# Patient Record
Sex: MALE | Race: WHITE | NOT HISPANIC OR LATINO
[De-identification: names, ages, dates, MRNs, and addresses within clinical notes are randomized per-mention and may not be internally consistent; named-entity substitution may affect disease eponyms.]

---

## 2022-04-11 ENCOUNTER — APPOINTMENT (OUTPATIENT)
Dept: FAMILY MEDICINE | Facility: CLINIC | Age: 27
End: 2022-04-11
Payer: OTHER GOVERNMENT

## 2022-04-11 VITALS
TEMPERATURE: 97 F | HEART RATE: 63 BPM | SYSTOLIC BLOOD PRESSURE: 98 MMHG | HEIGHT: 74 IN | DIASTOLIC BLOOD PRESSURE: 63 MMHG | OXYGEN SATURATION: 99 % | BODY MASS INDEX: 20.53 KG/M2 | WEIGHT: 160 LBS

## 2022-04-11 DIAGNOSIS — M54.9 DORSALGIA, UNSPECIFIED: ICD-10-CM

## 2022-04-11 DIAGNOSIS — Z80.8 FAMILY HISTORY OF MALIGNANT NEOPLASM OF OTHER ORGANS OR SYSTEMS: ICD-10-CM

## 2022-04-11 DIAGNOSIS — Z82.49 FAMILY HISTORY OF ISCHEMIC HEART DISEASE AND OTHER DISEASES OF THE CIRCULATORY SYSTEM: ICD-10-CM

## 2022-04-11 DIAGNOSIS — D22.9 MELANOCYTIC NEVI, UNSPECIFIED: ICD-10-CM

## 2022-04-11 DIAGNOSIS — Z83.79 FAMILY HISTORY OF OTHER DISEASES OF THE DIGESTIVE SYSTEM: ICD-10-CM

## 2022-04-11 DIAGNOSIS — Z83.49 FAMILY HISTORY OF OTHER ENDOCRINE, NUTRITIONAL AND METABOLIC DISEASES: ICD-10-CM

## 2022-04-11 DIAGNOSIS — G89.29 DORSALGIA, UNSPECIFIED: ICD-10-CM

## 2022-04-11 PROCEDURE — 99385 PREV VISIT NEW AGE 18-39: CPT

## 2022-04-11 RX ORDER — FLUTICASONE PROPIONATE 50 UG/1
50 SPRAY, METERED NASAL
Refills: 0 | Status: ACTIVE | COMMUNITY

## 2022-04-11 RX ORDER — MULTIVITAMIN
TABLET ORAL
Refills: 0 | Status: ACTIVE | COMMUNITY

## 2022-04-11 NOTE — PHYSICAL EXAM
[No Acute Distress] : no acute distress [Well Nourished] : well nourished [Well Developed] : well developed [Well-Appearing] : well-appearing [Normal Sclera/Conjunctiva] : normal sclera/conjunctiva [PERRL] : pupils equal round and reactive to light [EOMI] : extraocular movements intact [Normal Outer Ear/Nose] : the outer ears and nose were normal in appearance [Normal Oropharynx] : the oropharynx was normal [No Lymphadenopathy] : no lymphadenopathy [Supple] : supple [No Respiratory Distress] : no respiratory distress  [No Accessory Muscle Use] : no accessory muscle use [Clear to Auscultation] : lungs were clear to auscultation bilaterally [Normal Rate] : normal rate  [Regular Rhythm] : with a regular rhythm [Normal S1, S2] : normal S1 and S2 [No Edema] : there was no peripheral edema [No Palpable Aorta] : no palpable aorta [Soft] : abdomen soft [Non Tender] : non-tender [Non-distended] : non-distended [Normal Bowel Sounds] : normal bowel sounds [Normal Posterior Cervical Nodes] : no posterior cervical lymphadenopathy [Normal Anterior Cervical Nodes] : no anterior cervical lymphadenopathy [No Joint Swelling] : no joint swelling [Grossly Normal Strength/Tone] : grossly normal strength/tone [No Rash] : no rash [Coordination Grossly Intact] : coordination grossly intact [Normal Gait] : normal gait [Normal Affect] : the affect was normal [Normal Insight/Judgement] : insight and judgment were intact [de-identified] : +right shoulder lesion appears pink and dry; scatter moles on back

## 2022-04-11 NOTE — PLAN
[FreeTextEntry1] : 1. Health Maintenance:\par - routine labs\par - routine STI testing\par - follow up PRN\par \par 2. Skin Mole\par - Pt with lesion on right shoulder -- painful at times and increasing in size\par - Pt instructed to follow up with dermatology; referral  provided\par \par 3. Chronic Back Pain\par - Pt feels it is much improved with yoga; minimal and intermittent\par - per pt request referral for chiropractor provided\par \par 4. Nocturia/ Varicocele  \par - Pt reports nocturia despite change in bedtime sleep hygiene associated with dribbling and with h/o of Varicoele\par - Pt referred to Urology for further work up

## 2022-04-11 NOTE — HEALTH RISK ASSESSMENT
[Very Good] : ~his/her~  mood as very good [Never] : Never [Yes] : Yes [2 - 3 times a week (3 pts)] : 2 - 3  times a week (3 points) [1 or 2 (0 pts)] : 1 or 2 (0 points) [Never (0 pts)] : Never (0 points) [0] : 2) Feeling down, depressed, or hopeless: Not at all (0) [PHQ-2 Negative - No further assessment needed] : PHQ-2 Negative - No further assessment needed [With Significant Other] : lives with significant other [# of Members in Household ___] :  household currently consist of [unfilled] member(s) [] :  [Sexually Active] : sexually active [Feels Safe at Home] : Feels safe at home [Fully functional (bathing, dressing, toileting, transferring, walking, feeding)] : Fully functional (bathing, dressing, toileting, transferring, walking, feeding) [Fully functional (using the telephone, shopping, preparing meals, housekeeping, doing laundry, using] : Fully functional and needs no help or supervision to perform IADLs (using the telephone, shopping, preparing meals, housekeeping, doing laundry, using transportation, managing medications and managing finances) [Smoke Detector] : smoke detector [Carbon Monoxide Detector] : carbon monoxide detector [Seat Belt] :  uses seat belt [Audit-CScore] : 3 [de-identified] : runs, yoga, lifting  [de-identified] : balance  [[id number]] : 0 [Reports changes in hearing] : Reports no changes in hearing [Reports changes in vision] : Reports no changes in vision [Reports changes in dental health] : Reports no changes in dental health [Guns at Home] : no guns at home [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

## 2022-04-11 NOTE — HISTORY OF PRESENT ILLNESS
[FreeTextEntry1] : physical; establish care [de-identified] : Patient states he is doing well no acute complaints. UTD with dental and eye exams. Does note some "dribbling" of urine for about 2 minutes after urinating at night. Also, mentions some back and shoulder moles that he would like further evaluated by dermatology -- mentions mole on his shoulder has increased in size and is painful at times. Vaccinated for flu and covid vaccinated x2

## 2022-04-11 NOTE — REVIEW OF SYSTEMS
[Nocturia] : nocturia [Back Pain] : back pain [Mole Changes] : mole changes [Fever] : no fever [Chills] : no chills [Fatigue] : no fatigue [Discharge] : no discharge [Pain] : no pain [Itching] : no itching [Earache] : no earache [Nasal Discharge] : no nasal discharge [Sore Throat] : no sore throat [Chest Pain] : no chest pain [Palpitations] : no palpitations [Lower Ext Edema] : no lower extremity edema [Shortness Of Breath] : no shortness of breath [Wheezing] : no wheezing [Cough] : no cough [Abdominal Pain] : no abdominal pain [Nausea] : no nausea [Diarrhea] : no diarrhea [Vomiting] : no vomiting [Dysuria] : no dysuria [Joint Stiffness] : no joint stiffness [Joint Swelling] : no joint swelling [Skin Rash] : no skin rash [Headache] : no headache [Dizziness] : no dizziness [Memory Loss] : no memory loss [Anxiety] : no anxiety [Depression] : no depression [Easy Bleeding] : no easy bleeding [Easy Bruising] : no easy bruising [FreeTextEntry8] : +urinary dribbling [de-identified] : +right shoulder lesion increased in size

## 2022-06-03 ENCOUNTER — APPOINTMENT (OUTPATIENT)
Dept: UROLOGY | Facility: CLINIC | Age: 27
End: 2022-06-03

## 2022-06-22 ENCOUNTER — APPOINTMENT (OUTPATIENT)
Dept: FAMILY MEDICINE | Facility: CLINIC | Age: 27
End: 2022-06-22

## 2022-07-05 ENCOUNTER — APPOINTMENT (OUTPATIENT)
Dept: OPHTHALMOLOGY | Facility: CLINIC | Age: 27
End: 2022-07-05

## 2022-07-11 ENCOUNTER — APPOINTMENT (OUTPATIENT)
Dept: UROLOGY | Facility: CLINIC | Age: 27
End: 2022-07-11

## 2022-07-11 VITALS
TEMPERATURE: 98.3 F | OXYGEN SATURATION: 99 % | HEIGHT: 74 IN | WEIGHT: 160 LBS | HEART RATE: 54 BPM | SYSTOLIC BLOOD PRESSURE: 110 MMHG | BODY MASS INDEX: 20.53 KG/M2 | DIASTOLIC BLOOD PRESSURE: 70 MMHG

## 2022-07-11 DIAGNOSIS — R35.1 NOCTURIA: ICD-10-CM

## 2022-07-11 PROCEDURE — 51741 ELECTRO-UROFLOWMETRY FIRST: CPT

## 2022-07-11 PROCEDURE — 51798 US URINE CAPACITY MEASURE: CPT | Mod: 59

## 2022-07-11 PROCEDURE — 99242 OFF/OP CONSLTJ NEW/EST SF 20: CPT | Mod: 25

## 2022-07-11 NOTE — ASSESSMENT
[FreeTextEntry1] : 28y/o male with occasional urinary frequency during the night.\par The patient refers varicocele. Never been tested for sperm functionality.\par \par Scrotum regular, testis regular bilaterally in dimension and shape, no nodules upon palpation, no pain reported. \par Palpable varicocele bilaterally, mostly on the left side, during Valsalva.\par \par   - Uroflow \par Max flow: 20.2\par Avg flow:  10.7\par Voiding time: 56.2\par Voided volume: 180ml\par PVR: 0\par \par IPSS: 7 - mild\par QoL: mixed\par \par ordering urine culture for eventual ABX treatment.\par ordering semen analysis in consideration of the history of varicocele.\par \par For the next 30 days\par Suggested hydration 2LT/day\par Limiting the use of spicy food, citrus fruits, tomatoes, chocolate, alcohol\par \par \par \par \par .\par \par \par

## 2022-07-11 NOTE — PHYSICAL EXAM
[General Appearance - Well Developed] : well developed [General Appearance - Well Nourished] : well nourished [Normal Appearance] : normal appearance [Well Groomed] : well groomed [General Appearance - In No Acute Distress] : no acute distress [Edema] : no peripheral edema [Respiration, Rhythm And Depth] : normal respiratory rhythm and effort [Exaggerated Use Of Accessory Muscles For Inspiration] : no accessory muscle use [Abdomen Soft] : soft [Abdomen Tenderness] : non-tender [Costovertebral Angle Tenderness] : no ~M costovertebral angle tenderness [Urethral Meatus] : meatus normal [Penis Abnormality] : normal circumcised penis [Urinary Bladder Findings] : the bladder was normal on palpation [Scrotum] : the scrotum was normal [Testes Mass (___cm)] : there were no testicular masses [Normal Station and Gait] : the gait and station were normal for the patient's age [] : no rash [No Focal Deficits] : no focal deficits [Oriented To Time, Place, And Person] : oriented to person, place, and time [Affect] : the affect was normal [Mood] : the mood was normal [Not Anxious] : not anxious [No Palpable Adenopathy] : no palpable adenopathy [FreeTextEntry1] : Scrotum regular, testis regular bilaterally in dimension and shape, no nodules upon palpation, no pain reported. \par Palpable varicocele bilaterally, mostly on the left side, during Valsalva.

## 2022-07-11 NOTE — REVIEW OF SYSTEMS
[Wake up at night to urinate  How many times?  ___] : wakes up to urinate [unfilled] times during the night [Leakage of urine with urgency] : leakage of urine with urgency [Negative] : Heme/Lymph

## 2022-07-18 LAB — BACTERIA UR CULT: NORMAL

## 2022-07-19 ENCOUNTER — NON-APPOINTMENT (OUTPATIENT)
Age: 27
End: 2022-07-19

## 2022-07-19 ENCOUNTER — APPOINTMENT (OUTPATIENT)
Dept: OPHTHALMOLOGY | Facility: CLINIC | Age: 27
End: 2022-07-19

## 2022-07-19 PROCEDURE — 92004 COMPRE OPH EXAM NEW PT 1/>: CPT

## 2022-08-12 ENCOUNTER — NON-APPOINTMENT (OUTPATIENT)
Age: 27
End: 2022-08-12

## 2022-08-12 ENCOUNTER — APPOINTMENT (OUTPATIENT)
Dept: DERMATOLOGY | Facility: CLINIC | Age: 27
End: 2022-08-12

## 2022-08-12 DIAGNOSIS — D22.5 MELANOCYTIC NEVI OF TRUNK: ICD-10-CM

## 2022-08-12 PROCEDURE — 99203 OFFICE O/P NEW LOW 30 MIN: CPT

## 2022-08-12 NOTE — PHYSICAL EXAM
[Full Body Skin Exam Performed] : performed [FreeTextEntry3] : Skin examination performed of the face, neck, trunk, arms, legs; \par The patient is well, alert and oriented, pleasant and cooperative.\par Eyelids, conjunctivae, oral mucosa, digits and nails all normal.  \par No cervical adenopathy.\par \par Normal findings include:\par \par Multiple benign nevi were noted. few junctional nevi on back, also R medial heel; \par compound red/brown nevus R shoulder\par Angiomas\par Lentigines\par \par No lesions were suspicious for malignancy. \par \par

## 2022-08-12 NOTE — ASSESSMENT
[FreeTextEntry1] : Complete skin examination is negative for malignancy; Multiple new concerns were addressed and discussed.\par Therapeutic options and their risks and benefits; along with multiple diagnostic possibilities were discussed at length;\par risks and benefits of skin biopsy and/or other further study were discussed;\par \par Continue regular exams; Multiple nevi; + FMHx skin CA\par Follow up for TBSE in 1 year\par

## 2022-08-12 NOTE — HISTORY OF PRESENT ILLNESS
[de-identified] : Pt. presents for skin check;\par c/o few spots of concern;  \par Severity:  mild  \par Modifying factors:  none\par Associated symptoms:  none\par Context:  no association with activity \par Has + FMHX of skin cancer; \par Pt. is in [employer]; \par

## 2022-08-17 ENCOUNTER — APPOINTMENT (OUTPATIENT)
Dept: FAMILY MEDICINE | Facility: CLINIC | Age: 27
End: 2022-08-17

## 2022-08-17 VITALS
RESPIRATION RATE: 12 BRPM | DIASTOLIC BLOOD PRESSURE: 70 MMHG | SYSTOLIC BLOOD PRESSURE: 100 MMHG | HEIGHT: 74 IN | WEIGHT: 160 LBS | BODY MASS INDEX: 20.53 KG/M2 | HEART RATE: 65 BPM | TEMPERATURE: 97.8 F

## 2022-08-17 DIAGNOSIS — G43.011 MIGRAINE W/OUT AURA, INTRACTABLE, WITH STATUS MIGRAINOSUS: ICD-10-CM

## 2022-08-17 PROCEDURE — 99213 OFFICE O/P EST LOW 20 MIN: CPT

## 2022-08-17 NOTE — PHYSICAL EXAM
[No Acute Distress] : no acute distress [Well Nourished] : well nourished [Well Developed] : well developed [Well-Appearing] : well-appearing [Normal Sclera/Conjunctiva] : normal sclera/conjunctiva [PERRL] : pupils equal round and reactive to light [EOMI] : extraocular movements intact [Normal Outer Ear/Nose] : the outer ears and nose were normal in appearance [Normal Oropharynx] : the oropharynx was normal [No JVD] : no jugular venous distention [No Lymphadenopathy] : no lymphadenopathy [Supple] : supple [Thyroid Normal, No Nodules] : the thyroid was normal and there were no nodules present [No Accessory Muscle Use] : no accessory muscle use [Clear to Auscultation] : lungs were clear to auscultation bilaterally [Regular Rhythm] : with a regular rhythm [Normal S1, S2] : normal S1 and S2 [No Murmur] : no murmur heard [No Edema] : there was no peripheral edema [Soft] : abdomen soft [Non Tender] : non-tender [No Joint Swelling] : no joint swelling [No Rash] : no rash [Normal Gait] : normal gait [Normal Affect] : the affect was normal

## 2022-08-22 RX ORDER — TETRACAINE HYDROCHLORIDE 5 MG/ML
0.5 SOLUTION/ DROPS OPHTHALMIC
Qty: 1 | Refills: 0 | Status: DISCONTINUED | COMMUNITY
Start: 2022-08-16

## 2022-08-22 RX ORDER — ASCORBIC ACID 500 MG
500 TABLET ORAL
Qty: 100 | Refills: 0 | Status: DISCONTINUED | COMMUNITY
Start: 2022-08-16

## 2022-08-22 RX ORDER — CARBOXYMETHYLCELLULOSE SODIUM 0.5 G/100ML
0.5 SOLUTION/ DROPS OPHTHALMIC
Qty: 150 | Refills: 0 | Status: DISCONTINUED | COMMUNITY
Start: 2022-08-16

## 2022-08-22 RX ORDER — MOXIFLOXACIN HYDROCHLORIDE 5 MG/ML
0.5 SOLUTION/ DROPS OPHTHALMIC
Qty: 3 | Refills: 0 | Status: DISCONTINUED | COMMUNITY
Start: 2022-08-16

## 2022-08-22 RX ORDER — IBUPROFEN 800 MG/1
800 TABLET, FILM COATED ORAL
Qty: 14 | Refills: 0 | Status: DISCONTINUED | COMMUNITY
Start: 2022-08-16

## 2022-08-22 RX ORDER — FLUOROMETHOLONE 1 MG/ML
0.1 SOLUTION/ DROPS OPHTHALMIC
Qty: 10 | Refills: 0 | Status: DISCONTINUED | COMMUNITY
Start: 2022-08-16

## 2022-08-22 NOTE — HISTORY OF PRESENT ILLNESS
[FreeTextEntry8] : 27yM with no significant PMH presenting with headaches that are daily the past 2-3 weeks, 5 times a week. They are frontal in nature, constant pain, throbbing, nausea. Denies light or sound sensitivity. He always get it in the afternoon and feels like he can't do daily activities due to these headaches such as driving, cooking or studying. He woke up with it today and he feels they are debilitating. \par He has tried flonase and claritin in the past but they haven't been helping much now as he doesn't have any sinus issues. He takes tylenol 500mg 1 to 2 tabs a day. \par He only drinks 1 cup of coffee a day, getting normal 7 hours of sleep night but waking up earlier than normal.  \par

## 2022-08-22 NOTE — REVIEW OF SYSTEMS
[Headache] : headache [Fever] : no fever [Chills] : no chills [Fatigue] : no fatigue [Night Sweats] : no night sweats [Discharge] : no discharge [Pain] : no pain [Redness] : no redness [Vision Problems] : no vision problems [Itching] : no itching [Earache] : no earache [Hearing Loss] : no hearing loss [Nasal Discharge] : no nasal discharge [Sore Throat] : no sore throat [Chest Pain] : no chest pain [Palpitations] : no palpitations [Claudication] : no  leg claudication [Orthopena] : no orthopnea [Shortness Of Breath] : no shortness of breath [Wheezing] : no wheezing [Cough] : no cough [Dyspnea on Exertion] : not dyspnea on exertion [Itching] : no itching [Dizziness] : no dizziness [Confusion] : no confusion [Memory Loss] : no memory loss [Suicidal] : not suicidal [Insomnia] : no insomnia [Anxiety] : no anxiety

## 2022-08-22 NOTE — PLAN
[FreeTextEntry1] : Migraine \par - Start trial of triptan \par - Imaging script: CT head noncon, if migraines do not resolve \par - Patient to contact office if he feels headaches worsening in severity or not improving with triptans

## 2022-09-19 ENCOUNTER — APPOINTMENT (OUTPATIENT)
Dept: OPHTHALMOLOGY | Facility: CLINIC | Age: 27
End: 2022-09-19

## 2022-12-06 DIAGNOSIS — B07.9 VIRAL WART, UNSPECIFIED: ICD-10-CM

## 2022-12-21 RX ORDER — SUMATRIPTAN 50 MG/1
50 TABLET, FILM COATED ORAL
Qty: 9 | Refills: 3 | Status: ACTIVE | COMMUNITY
Start: 2022-08-17 | End: 1900-01-01

## 2022-12-27 ENCOUNTER — RX RENEWAL (OUTPATIENT)
Age: 27
End: 2022-12-27

## 2023-01-03 ENCOUNTER — APPOINTMENT (OUTPATIENT)
Dept: DERMATOLOGY | Facility: CLINIC | Age: 28
End: 2023-01-03
Payer: OTHER GOVERNMENT

## 2023-01-03 DIAGNOSIS — L81.4 OTHER MELANIN HYPERPIGMENTATION: ICD-10-CM

## 2023-01-03 DIAGNOSIS — D22.71 MELANOCYTIC NEVI OF RIGHT LOWER LIMB, INCLUDING HIP: ICD-10-CM

## 2023-01-03 PROCEDURE — 99214 OFFICE O/P EST MOD 30 MIN: CPT

## 2023-01-03 RX ORDER — ERYTHROMYCIN 5 MG/G
5 OINTMENT OPHTHALMIC
Qty: 3 | Refills: 0 | Status: DISCONTINUED | COMMUNITY
Start: 2022-07-19

## 2023-01-03 NOTE — ASSESSMENT
[FreeTextEntry1] : Benign junctional nevus R foot; no suspicious features; monitor at annual exams\par \par Therapeutic options and their risks and benefits; along with multiple diagnostic possibilities were discussed at length; risks and benefits of further study were discussed;\par \par scalp;  no apparent male pattern thinning; age 27; observe

## 2023-01-03 NOTE — HISTORY OF PRESENT ILLNESS
[de-identified] : The patient has been fit in for urgent appointment.\par c/o mole on foot\par also:  ? hair thinning; over last 1-2 years;

## 2023-01-03 NOTE — PHYSICAL EXAM
[FreeTextEntry3] : Right medial heel;  4mm dark brown regular macule\par \par scalp;  no apparent male pattern thinning; \par mild recession at frontal hairline

## 2023-04-18 ENCOUNTER — APPOINTMENT (OUTPATIENT)
Dept: FAMILY MEDICINE | Facility: CLINIC | Age: 28
End: 2023-04-18
Payer: OTHER GOVERNMENT

## 2023-04-18 VITALS
DIASTOLIC BLOOD PRESSURE: 69 MMHG | TEMPERATURE: 97.4 F | BODY MASS INDEX: 21.82 KG/M2 | HEART RATE: 58 BPM | OXYGEN SATURATION: 97 % | WEIGHT: 170 LBS | HEIGHT: 74 IN | SYSTOLIC BLOOD PRESSURE: 105 MMHG

## 2023-04-18 DIAGNOSIS — G43.909 MIGRAINE, UNSPECIFIED, NOT INTRACTABLE, W/OUT STATUS MIGRAINOSUS: ICD-10-CM

## 2023-04-18 DIAGNOSIS — Z00.00 ENCOUNTER FOR GENERAL ADULT MEDICAL EXAMINATION W/OUT ABNORMAL FINDINGS: ICD-10-CM

## 2023-04-18 PROCEDURE — 99395 PREV VISIT EST AGE 18-39: CPT

## 2023-04-18 RX ORDER — SUMATRIPTAN 25 MG/1
25 TABLET, FILM COATED ORAL
Qty: 8 | Refills: 0 | Status: DISCONTINUED | COMMUNITY
Start: 2022-12-27 | End: 2023-04-18

## 2023-04-18 NOTE — HEALTH RISK ASSESSMENT
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

## 2023-04-18 NOTE — PLAN
[FreeTextEntry1] : #HCM\par - check labs\par \par #Migraine\par #TMJ\par - cont sumatriptan\par - ENT referral for TMJ\par \par #Varicocele\par - uro f/u

## 2023-04-18 NOTE — HISTORY OF PRESENT ILLNESS
[FreeTextEntry1] : CPE [de-identified] : 27 year old male with PMH of migraines presents today for CPE. Started on triptan last visit. Gets 2 month, some times 3 in a week. Has TMJ issues, feels this is contributing to migraines. Triptan helping. No urinary symptoms. Saw uro last year and was found to have varicoceles. Needs Uro follow up. No complaints otherwise. \par \par

## 2023-04-26 LAB
ALBUMIN SERPL ELPH-MCNC: 4.8 G/DL
ALP BLD-CCNC: 53 U/L
ALT SERPL-CCNC: 9 U/L
ANION GAP SERPL CALC-SCNC: 10 MMOL/L
AST SERPL-CCNC: 14 U/L
BASOPHILS # BLD AUTO: 0.02 K/UL
BASOPHILS NFR BLD AUTO: 0.4 %
BILIRUB SERPL-MCNC: 0.5 MG/DL
BUN SERPL-MCNC: 9 MG/DL
CALCIUM SERPL-MCNC: 9.6 MG/DL
CHLORIDE SERPL-SCNC: 106 MMOL/L
CHOLEST SERPL-MCNC: 116 MG/DL
CO2 SERPL-SCNC: 27 MMOL/L
CREAT SERPL-MCNC: 0.95 MG/DL
EGFR: 110 ML/MIN/1.73M2
EOSINOPHIL # BLD AUTO: 0.03 K/UL
EOSINOPHIL NFR BLD AUTO: 0.6 %
ESTIMATED AVERAGE GLUCOSE: 108 MG/DL
GLUCOSE SERPL-MCNC: 96 MG/DL
HBA1C MFR BLD HPLC: 5.4 %
HCT VFR BLD CALC: 42.3 %
HDLC SERPL-MCNC: 50 MG/DL
HGB BLD-MCNC: 14 G/DL
IMM GRANULOCYTES NFR BLD AUTO: 0.2 %
LDLC SERPL CALC-MCNC: 52 MG/DL
LYMPHOCYTES # BLD AUTO: 1.56 K/UL
LYMPHOCYTES NFR BLD AUTO: 33.8 %
MAN DIFF?: NORMAL
MCHC RBC-ENTMCNC: 29.4 PG
MCHC RBC-ENTMCNC: 33.1 GM/DL
MCV RBC AUTO: 88.9 FL
MONOCYTES # BLD AUTO: 0.36 K/UL
MONOCYTES NFR BLD AUTO: 7.8 %
NEUTROPHILS # BLD AUTO: 2.64 K/UL
NEUTROPHILS NFR BLD AUTO: 57.2 %
NONHDLC SERPL-MCNC: 67 MG/DL
PLATELET # BLD AUTO: 191 K/UL
POTASSIUM SERPL-SCNC: 4.3 MMOL/L
PROT SERPL-MCNC: 7.3 G/DL
RBC # BLD: 4.76 M/UL
RBC # FLD: 12.4 %
SODIUM SERPL-SCNC: 144 MMOL/L
TRIGL SERPL-MCNC: 71 MG/DL
TSH SERPL-ACNC: 1.56 UIU/ML
WBC # FLD AUTO: 4.62 K/UL

## 2023-05-05 ENCOUNTER — TRANSCRIPTION ENCOUNTER (OUTPATIENT)
Age: 28
End: 2023-05-05

## 2023-05-05 ENCOUNTER — APPOINTMENT (OUTPATIENT)
Dept: UROLOGY | Facility: CLINIC | Age: 28
End: 2023-05-05
Payer: OTHER GOVERNMENT

## 2023-05-05 VITALS
OXYGEN SATURATION: 99 % | TEMPERATURE: 97.4 F | HEART RATE: 69 BPM | DIASTOLIC BLOOD PRESSURE: 52 MMHG | SYSTOLIC BLOOD PRESSURE: 100 MMHG

## 2023-05-05 DIAGNOSIS — I86.1 SCROTAL VARICES: ICD-10-CM

## 2023-05-05 PROCEDURE — 99213 OFFICE O/P EST LOW 20 MIN: CPT

## 2023-05-05 NOTE — HISTORY OF PRESENT ILLNESS
[FreeTextEntry1] : 26y/o male with occasional urinary frequency during the night.\par The patient refers varicocele. Ordered semen analysis during last visit, but never performed.

## 2023-05-05 NOTE — ASSESSMENT
[FreeTextEntry1] : 28y/o male with occasional urinary frequency during the night.\par The patient refers varicocele. Never been tested for sperm functionality.\par \par Scrotum regular, testis regular bilaterally in dimension and shape, no nodules upon palpation, no pain reported. \par Palpable varicocele bilaterally, mostly on the left side, during Valsalva.\par \par Last Uroflow \par Max flow: 20.2\par Avg flow:  10.7\par Voiding time: 56.2\par Voided volume: 180ml\par PVR: 0\par \par \par Ordering semen analysis\par \par Will F/U in case of positive results \par \par \par \par \par \par \par .\par \par \par

## 2023-05-05 NOTE — PHYSICAL EXAM
[General Appearance - Well Developed] : well developed [General Appearance - Well Nourished] : well nourished [Normal Appearance] : normal appearance [Well Groomed] : well groomed [General Appearance - In No Acute Distress] : no acute distress [Abdomen Soft] : soft [Abdomen Tenderness] : non-tender [Costovertebral Angle Tenderness] : no ~M costovertebral angle tenderness [Urethral Meatus] : meatus normal [Penis Abnormality] : normal circumcised penis [Urinary Bladder Findings] : the bladder was normal on palpation [Scrotum] : the scrotum was normal [Testes Mass (___cm)] : there were no testicular masses [FreeTextEntry1] : Scrotum regular, testis regular bilaterally in dimension and shape, no nodules upon palpation, no pain reported. \par Palpable varicocele bilaterally, mostly on the left side, during Valsalva. [Edema] : no peripheral edema [] : no respiratory distress [Respiration, Rhythm And Depth] : normal respiratory rhythm and effort [Exaggerated Use Of Accessory Muscles For Inspiration] : no accessory muscle use [Oriented To Time, Place, And Person] : oriented to person, place, and time [Affect] : the affect was normal [Mood] : the mood was normal [Not Anxious] : not anxious [Normal Station and Gait] : the gait and station were normal for the patient's age [No Focal Deficits] : no focal deficits [No Palpable Adenopathy] : no palpable adenopathy

## 2023-05-31 ENCOUNTER — NON-APPOINTMENT (OUTPATIENT)
Age: 28
End: 2023-05-31

## 2023-05-31 ENCOUNTER — APPOINTMENT (OUTPATIENT)
Dept: OTOLARYNGOLOGY | Facility: CLINIC | Age: 28
End: 2023-05-31
Payer: OTHER GOVERNMENT

## 2023-05-31 VITALS
HEIGHT: 74 IN | DIASTOLIC BLOOD PRESSURE: 70 MMHG | HEART RATE: 58 BPM | WEIGHT: 165 LBS | BODY MASS INDEX: 21.17 KG/M2 | SYSTOLIC BLOOD PRESSURE: 102 MMHG

## 2023-05-31 DIAGNOSIS — J34.2 DEVIATED NASAL SEPTUM: ICD-10-CM

## 2023-05-31 DIAGNOSIS — R51.9 HEADACHE, UNSPECIFIED: ICD-10-CM

## 2023-05-31 DIAGNOSIS — M26.609 UNSPECIFIED TEMPOROMANDIBULAR JOINT DISORDER: ICD-10-CM

## 2023-05-31 PROCEDURE — 99244 OFF/OP CNSLTJ NEW/EST MOD 40: CPT

## 2023-05-31 NOTE — HISTORY OF PRESENT ILLNESS
[de-identified] : Patient states all his life he has been having pain in his jaw, others in room can hear clicking sound when he opens he jaw wide. Pt notes he thinks TMJ might be causing his headaches once to twice a week. He thinks he grinds his teeth at night. Spoke to his pcp and was referred to ENT for further evaluation. Denies family history of migraines. Notes head aches are around eye or a full band around head. denies migraine being affected by light or smell. Notes he occasionally gets nasal congestion, but can't tell if it is tied to headaches. Notes he tried Tylenol and Motrin for over 6 weeks with no relief even with taking Sumatriptan.

## 2023-05-31 NOTE — ASSESSMENT
[FreeTextEntry1] : Reviewed and reconciled medications, allergies, PMHx, PSHx, SocHx, FMHx.\par \par Patient states all his life he has been having pain in his jaw, others in room can hear clicking sound when he opens he jaw wide. Pt notes he thinks TMJ might be causing his headaches once to twice a week. He thinks he grinds his teeth at night. Spoke to his pcp and was referred to ENT for further evaluation. Denies family history of migraines. Notes head aches are around eye or a full band around head. denies migraine being affected by light or smell. Notes he occasionally gets nasal congestion, but can't tell if it is tied to headaches. Notes he tried Tylenol and Motrin for over 6 weeks with no relief even with taking Sumatriptan. \par \par Physical Exam -\par deviated septum\par TMJ completely comes out of position, grinding, and tender\par right ear: minimal displacement of anterior canal wall when he opens and closes mouth, bony exostosis anterior canal\par left ear: much more displacement of anterior canal wall when he opens and closes mouth\par \par Plan: \par MRI TMJ and brain ordered. Pt may need to consult with TMJ specialist or neurologist pending MRI results. TMJ instruction sheet provided.

## 2023-05-31 NOTE — CONSULT LETTER
[Dear  ___] : Dear  [unfilled], [Consult Letter:] : I had the pleasure of evaluating your patient, [unfilled]. [Please see my note below.] : Please see my note below. [Consult Closing:] : Thank you very much for allowing me to participate in the care of this patient.  If you have any questions, please do not hesitate to contact me. [Sincerely,] : Sincerely, [FreeTextEntry3] : Lawrence Benton MD FACS

## 2023-05-31 NOTE — REVIEW OF SYSTEMS
[Seasonal Allergies] : seasonal allergies [Post Nasal Drip] : post nasal drip [Dizziness] : dizziness [Vertigo] : vertigo [Ear Noises] : ear noises [Lightheadedness] : lightheadedness [Nasal Congestion] : nasal congestion [Sinus Pain] : sinus pain [Sinus Pressure] : sinus pressure [Negative] : Heme/Lymph

## 2023-05-31 NOTE — ADDENDUM
[FreeTextEntry1] : Documented by Laura Davidson acting as scribe for Dr. Benton on 05/31/2023.\par \par All Medical record entries made by the scribe were at my, Dr. Benton,direction and personally dictated by me on 05/31/2023. I have reviewed the chart and agree that the record accurately reflects my personal performance of the history, physical exam, assessment and plan. I have also personally directed, reviewed, and agreed with the discharge instructions.

## 2023-05-31 NOTE — PHYSICAL EXAM
[Hearing Kaba Test (Tuning Fork On Forehead)] : no lateralization of tone [Midline] : trachea located in midline position [Normal] : no rashes [] : septum deviated bilaterally [Hearing Loss Right Only] : normal [Hearing Loss Left Only] : normal [FreeTextEntry8] : bony exostosis anterior canal, minimal displacement of anterior canal wall when he opens and closes mouth [FreeTextEntry9] : much more displacement of anterior canal wall when he opens and closes mouth [FreeTextEntry2] : sinuses non tender [de-identified] : DERRICK

## 2023-06-13 ENCOUNTER — APPOINTMENT (OUTPATIENT)
Dept: MRI IMAGING | Facility: CLINIC | Age: 28
End: 2023-06-13
Payer: OTHER GOVERNMENT

## 2023-06-13 PROCEDURE — 70553 MRI BRAIN STEM W/O & W/DYE: CPT

## 2023-06-13 PROCEDURE — A9585: CPT

## 2023-06-13 PROCEDURE — 70336 MAGNETIC IMAGE JAW JOINT: CPT

## 2023-08-02 ENCOUNTER — APPOINTMENT (OUTPATIENT)
Dept: ORTHOPEDIC SURGERY | Facility: CLINIC | Age: 28
End: 2023-08-02

## 2023-08-03 ENCOUNTER — APPOINTMENT (OUTPATIENT)
Dept: FAMILY MEDICINE | Facility: CLINIC | Age: 28
End: 2023-08-03
Payer: OTHER GOVERNMENT

## 2023-08-03 VITALS
HEIGHT: 73 IN | SYSTOLIC BLOOD PRESSURE: 102 MMHG | TEMPERATURE: 97.8 F | OXYGEN SATURATION: 98 % | DIASTOLIC BLOOD PRESSURE: 70 MMHG | BODY MASS INDEX: 21.6 KG/M2 | WEIGHT: 163 LBS | HEART RATE: 61 BPM

## 2023-08-03 DIAGNOSIS — M25.571 PAIN IN RIGHT ANKLE AND JOINTS OF RIGHT FOOT: ICD-10-CM

## 2023-08-03 DIAGNOSIS — G89.29 PAIN IN RIGHT ANKLE AND JOINTS OF RIGHT FOOT: ICD-10-CM

## 2023-08-03 DIAGNOSIS — M25.561 PAIN IN RIGHT KNEE: ICD-10-CM

## 2023-08-03 PROCEDURE — 99213 OFFICE O/P EST LOW 20 MIN: CPT

## 2023-08-03 RX ORDER — FLUTICASONE PROPIONATE 50 UG/1
50 SPRAY, METERED NASAL TWICE DAILY
Qty: 1 | Refills: 0 | Status: ACTIVE | COMMUNITY

## 2023-08-03 NOTE — HEALTH RISK ASSESSMENT
[Yes] : Yes [2 - 3 times a week (3 pts)] : 2 - 3  times a week (3 points) [3 or 4 (1 pt)] : 3 or 4  (1 point) [Never (0 pts)] : Never (0 points) [0] : 1) Little interest or pleasure doing things: Not at all (0) [1] : 2) Feeling down, depressed, or hopeless for several days (1) [PHQ-2 Negative - No further assessment needed] : PHQ-2 Negative - No further assessment needed [[id number]] : 1 [de-identified] : occasional cigar

## 2023-08-03 NOTE — PHYSICAL EXAM
[Normal] : normal rate, regular rhythm, normal S1 and S2 and no murmur heard [No Edema] : there was no peripheral edema [No Extremity Clubbing/Cyanosis] : no extremity clubbing/cyanosis [Soft] : abdomen soft [Non Tender] : non-tender [No CVA Tenderness] : no CVA  tenderness [Grossly Normal Strength/Tone] : grossly normal strength/tone [No Rash] : no rash [Coordination Grossly Intact] : coordination grossly intact [No Focal Deficits] : no focal deficits [Normal Gait] : normal gait [Normal Affect] : the affect was normal [Normal Insight/Judgement] : insight and judgment were intact [de-identified] : +spinal tenderness over T7 vertebra, pain elicited on extension; ROM intact; flattened thoracic kyphosis, flattened lumbar lordosis [de-identified] : +ganglion cyst over L dorsal wrist, pain elicited with wrist extension; +ROM intact bilateral ankles; knee joints; no sharp pain elicited with ROM testing [de-identified] : +R ankle scar s/p surgery; +L wrist scar s/p ganglion cyst removal

## 2023-08-03 NOTE — REVIEW OF SYSTEMS
[Joint Pain] : joint pain [Back Pain] : back pain [Joint Swelling] : joint swelling [Headache] : headache [Negative] : Integumentary [Fever] : no fever [Chills] : no chills [Chest Pain] : no chest pain [Palpitations] : no palpitations [Shortness Of Breath] : no shortness of breath [Wheezing] : no wheezing [Cough] : no cough [Abdominal Pain] : no abdominal pain [Nausea] : no nausea [Constipation] : no constipation [Diarrhea] : no diarrhea [Dysuria] : no dysuria [Frequency] : no frequency [Joint Stiffness] : no joint stiffness [Muscle Weakness] : no muscle weakness [Dizziness] : no dizziness [Suicidal] : not suicidal [Insomnia] : no insomnia [Anxiety] : no anxiety [Depression] : no depression [Easy Bleeding] : no easy bleeding [Easy Bruising] : no easy bruising

## 2023-08-03 NOTE — ASSESSMENT
[FreeTextEntry1] : #Back pain, chronic since  service - XR, PT scripts provided - Massage therapy referral - Advised NSAIDs, ibuprofen - Provided information for Dr. Gagnon (Sports Med)  #Migraine - Continue Tylenol, recommended Excedrin migraine - Has appointment to see TMJ specialist in 1 week  - Triptans do not help, okay to discontinue - Provided information for headache neurologist Dr. Ramires  #R knee and ankle pain - s/p multiple ankle surgeries - XR, PT scripts provided - Advised NSAIDs, ibuprofen - Provided information for Dr. Gagnon (Sports Med)  #Ganglion cyst x1 month - Provided information for Dr. Gagnon (Sports Med) for possible aspiration   #Mental health - Provided information for [facility]

## 2023-08-03 NOTE — HISTORY OF PRESENT ILLNESS
[FreeTextEntry8] : 29 yo presents to clinic with mid-thoracic back pain, R knee and ankle pain, migraines, and L wrist cyst/swelling. First noticed back pain when he was in the [employer] but did not have a chance to have it addressed at the time, reports constant back pain localized to T7 vertebral segment on exam, exacerbated with movement. Patient reports that he wore equipment in the [employer] with a buckle at the same spot on his back. Also complaining of dull R ankle pain with radiating pain shooting to the lower leg up to the lateral R knee. Had ankle fracture in ~2011, freshman year of college s/p full reconstruction, now bothering him with increased tightness of R knee. Denies taking NSAIDs, ibuprofen for his back pain and R sided ankle/knee pain as his pain has not been severe enough to the point he feels the need to take something. Also c/o 1 month ganglion cyst dorsal wrist, pain exacerbated with movement, wrist extension and especially when he is out golfing. Patient has a history of previous ganglion cyst at the left dorsal wrist which was removed during his time in the [employer].  Patient is going to his TMJ specialist in 1 week. Was previously told he has tension headaches. 2 weeks ago, had a "bad migraine" with TMJ pain. Interested in seeing neurologist. Has not been getting relief with triptan medication, Tylenol helps "a little bit". Patient takes 500mg x2 if he wakes up with a headache. Gets headaches in a "3 week cycle" in which 1 week will be worse and 2 weeks will be okay.Will give information for headache specialist - .   Goes to [facility] for [facility] program. Interested in seeing someone to keep up with his mental health as his fellow  friends have been vigilant about their mental health. Patient has been feeling angry last week without a source. Denies thoughts of suicidal harm, ideation.

## 2023-08-16 ENCOUNTER — APPOINTMENT (OUTPATIENT)
Dept: DERMATOLOGY | Facility: CLINIC | Age: 28
End: 2023-08-16

## 2023-08-25 ENCOUNTER — APPOINTMENT (OUTPATIENT)
Dept: ORTHOPEDIC SURGERY | Facility: CLINIC | Age: 28
End: 2023-08-25

## 2023-08-25 NOTE — HISTORY OF PRESENT ILLNESS
[de-identified] : Patient is a 28 year-old male who presents to the office today for initial evaluation of lower back pain.  Upon detailed questioning of the patient, they deny any complaints of fever, chills, sweats, nausea or vomiting, bowel or bladder dysfunction, saddle anesthesia, recent weight loss or gain, or night pain. The above history is in addition to the intake form, completed by the patient, that I personally reviewed and discussed with her at length during this visit. There is no correlation identified between their presenting symptoms and their family, social and past medical history.

## 2023-09-14 ENCOUNTER — APPOINTMENT (OUTPATIENT)
Dept: HUMAN REPRODUCTION | Facility: CLINIC | Age: 28
End: 2023-09-14

## 2023-12-06 ENCOUNTER — NON-APPOINTMENT (OUTPATIENT)
Age: 28
End: 2023-12-06

## 2023-12-06 ENCOUNTER — APPOINTMENT (OUTPATIENT)
Age: 28
End: 2023-12-06
Payer: OTHER GOVERNMENT

## 2023-12-06 PROCEDURE — 70355 PANORAMIC X-RAY OF JAWS: CPT | Mod: 26

## 2023-12-06 PROCEDURE — 99204 OFFICE O/P NEW MOD 45 MIN: CPT

## 2023-12-07 ENCOUNTER — APPOINTMENT (OUTPATIENT)
Dept: ORTHOPEDIC SURGERY | Facility: CLINIC | Age: 28
End: 2023-12-07
Payer: OTHER GOVERNMENT

## 2023-12-07 ENCOUNTER — NON-APPOINTMENT (OUTPATIENT)
Age: 28
End: 2023-12-07

## 2023-12-07 VITALS
DIASTOLIC BLOOD PRESSURE: 72 MMHG | HEART RATE: 61 BPM | HEIGHT: 74 IN | OXYGEN SATURATION: 97 % | WEIGHT: 162 LBS | SYSTOLIC BLOOD PRESSURE: 110 MMHG | BODY MASS INDEX: 20.79 KG/M2

## 2023-12-07 DIAGNOSIS — M67.432 GANGLION, LEFT WRIST: ICD-10-CM

## 2023-12-07 DIAGNOSIS — M25.511 PAIN IN RIGHT SHOULDER: ICD-10-CM

## 2023-12-07 DIAGNOSIS — G89.29 PAIN IN RIGHT SHOULDER: ICD-10-CM

## 2023-12-07 PROCEDURE — 99244 OFF/OP CNSLTJ NEW/EST MOD 40: CPT

## 2024-01-30 ENCOUNTER — APPOINTMENT (OUTPATIENT)
Dept: DERMATOLOGY | Facility: CLINIC | Age: 29
End: 2024-01-30

## 2024-02-26 ENCOUNTER — APPOINTMENT (OUTPATIENT)
Dept: MRI IMAGING | Facility: CLINIC | Age: 29
End: 2024-02-26
Payer: OTHER GOVERNMENT

## 2024-02-26 PROCEDURE — 73221 MRI JOINT UPR EXTREM W/O DYE: CPT | Mod: LT

## 2024-03-12 ENCOUNTER — APPOINTMENT (OUTPATIENT)
Dept: ORTHOPEDIC SURGERY | Facility: CLINIC | Age: 29
End: 2024-03-12
Payer: OTHER GOVERNMENT

## 2024-03-12 VITALS — WEIGHT: 170 LBS | HEIGHT: 74 IN | BODY MASS INDEX: 21.82 KG/M2

## 2024-03-12 DIAGNOSIS — M25.332 OTHER INSTABILITY, LEFT WRIST: ICD-10-CM

## 2024-03-12 DIAGNOSIS — M67.432 GANGLION, LEFT WRIST: ICD-10-CM

## 2024-03-12 PROCEDURE — 73110 X-RAY EXAM OF WRIST: CPT | Mod: LT

## 2024-03-12 PROCEDURE — 99204 OFFICE O/P NEW MOD 45 MIN: CPT

## 2024-04-23 ENCOUNTER — APPOINTMENT (OUTPATIENT)
Dept: ORTHOPEDIC SURGERY | Facility: CLINIC | Age: 29
End: 2024-04-23

## 2024-05-02 ENCOUNTER — APPOINTMENT (OUTPATIENT)
Dept: FAMILY MEDICINE | Facility: CLINIC | Age: 29
End: 2024-05-02

## 2024-08-22 ENCOUNTER — APPOINTMENT (OUTPATIENT)
Dept: ORTHOPEDIC SURGERY | Facility: AMBULATORY SURGERY CENTER | Age: 29
End: 2024-08-22

## 2024-08-22 ENCOUNTER — TRANSCRIPTION ENCOUNTER (OUTPATIENT)
Age: 29
End: 2024-08-22

## 2024-09-03 ENCOUNTER — APPOINTMENT (OUTPATIENT)
Dept: ORTHOPEDIC SURGERY | Facility: CLINIC | Age: 29
End: 2024-09-03
Payer: OTHER GOVERNMENT

## 2024-09-03 DIAGNOSIS — M25.332 OTHER INSTABILITY, LEFT WRIST: ICD-10-CM

## 2024-09-03 PROCEDURE — 99024 POSTOP FOLLOW-UP VISIT: CPT

## 2024-09-04 ENCOUNTER — NON-APPOINTMENT (OUTPATIENT)
Age: 29
End: 2024-09-04

## 2024-09-04 ENCOUNTER — APPOINTMENT (OUTPATIENT)
Dept: FAMILY MEDICINE | Facility: CLINIC | Age: 29
End: 2024-09-04
Payer: OTHER GOVERNMENT

## 2024-09-04 VITALS
HEIGHT: 74 IN | WEIGHT: 165 LBS | RESPIRATION RATE: 16 BRPM | OXYGEN SATURATION: 98 % | DIASTOLIC BLOOD PRESSURE: 60 MMHG | HEART RATE: 54 BPM | SYSTOLIC BLOOD PRESSURE: 90 MMHG | TEMPERATURE: 98.2 F | BODY MASS INDEX: 21.17 KG/M2

## 2024-09-04 VITALS — SYSTOLIC BLOOD PRESSURE: 92 MMHG | DIASTOLIC BLOOD PRESSURE: 70 MMHG

## 2024-09-04 DIAGNOSIS — Z13.1 ENCOUNTER FOR SCREENING FOR DIABETES MELLITUS: ICD-10-CM

## 2024-09-04 DIAGNOSIS — M26.609 UNSPECIFIED TEMPOROMANDIBULAR JOINT DISORDER: ICD-10-CM

## 2024-09-04 DIAGNOSIS — G43.909 MIGRAINE, UNSPECIFIED, NOT INTRACTABLE, W/OUT STATUS MIGRAINOSUS: ICD-10-CM

## 2024-09-04 DIAGNOSIS — R42 DIZZINESS AND GIDDINESS: ICD-10-CM

## 2024-09-04 DIAGNOSIS — I86.1 SCROTAL VARICES: ICD-10-CM

## 2024-09-04 PROCEDURE — 93000 ELECTROCARDIOGRAM COMPLETE: CPT

## 2024-09-04 PROCEDURE — 99395 PREV VISIT EST AGE 18-39: CPT

## 2024-09-04 RX ORDER — MUPIROCIN 20 MG/G
2 OINTMENT TOPICAL
Qty: 22 | Refills: 0 | Status: DISCONTINUED | COMMUNITY
Start: 2024-08-28

## 2024-09-04 NOTE — HEALTH RISK ASSESSMENT
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

## 2024-09-04 NOTE — REVIEW OF SYSTEMS
[TextEntry] : CONSTITUTIONAL: denies fever, chills, fatigue, weakness  HEENT: denies blurred vision, sore throat  SKIN: denies new lesions, rash CARDIOVASCULAR: denies chest pain, chest pressure, palpitations  RESPIRATORY: denies shortness of breath, cough, sputum production  GASTROINTESTINAL: denies nausea, vomiting, diarrhea, abdominal pain, melena or hematochezia  GENITOURINARY: denies dysuria, discharge  NEUROLOGICAL: +dizziness. denies numbness, headache, focal weakness  MUSCULOSKELETAL: denies new joint pain, muscle aches  HEMATOLOGIC: denies gross bleeding, bruising

## 2024-09-04 NOTE — HISTORY OF PRESENT ILLNESS
[FreeTextEntry1] : CPE [de-identified] : 29 year old male with PMH of migraines, TMJ, ganglion cyst s/p 2 weeks ago. Presents today for CPE.  Patient has left wrist ganglion cyst removal 2 weeks ago. Patient reports pain has improved but now has worsening numbness in hand. Patient still having migraines for ~15 years with some improvement with acupuncture and massage therapy. Patient says he has improvement with triptan medication and would like refills. Patient requesting for neurology referral. Patient also dealing with bilateral TMJ for the past 15 years and would like a new oral surgeon referral. Feels like TMJ is contributing to migraines. Triptan helping. No urinary symptoms. Sees urology annually for varicoceles. Patient currently complaining of dizziness for the past year once a month. Denies fever, chills, CP, SOB, LOC, palpitations, abd pain, n/v/d/c. Patient also follows with dermatology and had a few moles removed, pending biopsy results.

## 2024-09-04 NOTE — HISTORY OF PRESENT ILLNESS
[FreeTextEntry1] : CPE [de-identified] : 29 year old male with PMH of migraines, TMJ, ganglion cyst s/p 2 weeks ago. Presents today for CPE.  Patient has left wrist ganglion cyst removal 2 weeks ago. Patient reports pain has improved but now has worsening numbness in hand. Patient still having migraines for ~15 years with some improvement with acupuncture and massage therapy. Patient says he has improvement with triptan medication and would like refills. Patient requesting for neurology referral. Patient also dealing with bilateral TMJ for the past 15 years and would like a new oral surgeon referral. Feels like TMJ is contributing to migraines. Triptan helping. No urinary symptoms. Sees urology annually for varicoceles. Patient currently complaining of dizziness for the past year once a month. Denies fever, chills, CP, SOB, LOC, palpitations, abd pain, n/v/d/c. Patient also follows with dermatology and had a few moles removed, pending biopsy results.

## 2024-09-04 NOTE — PLAN
[FreeTextEntry1] : #HCM - routine labs - UTD with healthcare maintenance  #dizziness: - reports intermittent dizziness for the past year  - borderline hypotensive at 92/72 today - MRI head in 2023 was negative - ECG shows no signs of acute ischemia, but ST wave changes noted in lateral leads and V3 - cardiology referral given - neurology referral given  #TMJ - MRI head negative - patient seen by ENT and oral surgeon who deferred surgery - another oral facial surgeon referral given  #Migraine - likely related to TMJ - continue triptan as needed, prescription sent - neurology referral given  #Varicocele - history of nocturia, no recent changes  - has annual urology appointment, will follow up

## 2024-09-04 NOTE — HEALTH RISK ASSESSMENT
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

## 2024-09-05 LAB
ALBUMIN SERPL ELPH-MCNC: 5 G/DL
ALP BLD-CCNC: 50 U/L
ALT SERPL-CCNC: 11 U/L
ANION GAP SERPL CALC-SCNC: 14 MMOL/L
AST SERPL-CCNC: 20 U/L
BASOPHILS # BLD AUTO: 0.03 K/UL
BASOPHILS NFR BLD AUTO: 0.5 %
BILIRUB SERPL-MCNC: 0.7 MG/DL
BUN SERPL-MCNC: 15 MG/DL
CALCIUM SERPL-MCNC: 9.9 MG/DL
CHLORIDE SERPL-SCNC: 104 MMOL/L
CHOLEST SERPL-MCNC: 143 MG/DL
CO2 SERPL-SCNC: 24 MMOL/L
CREAT SERPL-MCNC: 0.94 MG/DL
EGFR: 111 ML/MIN/1.73M2
EOSINOPHIL # BLD AUTO: 0.03 K/UL
EOSINOPHIL NFR BLD AUTO: 0.5 %
ESTIMATED AVERAGE GLUCOSE: 103 MG/DL
GLUCOSE SERPL-MCNC: 86 MG/DL
HBA1C MFR BLD HPLC: 5.2 %
HCT VFR BLD CALC: 41.5 %
HDLC SERPL-MCNC: 49 MG/DL
HGB BLD-MCNC: 14 G/DL
IMM GRANULOCYTES NFR BLD AUTO: 0.3 %
LDLC SERPL CALC-MCNC: 83 MG/DL
LYMPHOCYTES # BLD AUTO: 2.02 K/UL
LYMPHOCYTES NFR BLD AUTO: 30.6 %
MAN DIFF?: NORMAL
MCHC RBC-ENTMCNC: 29.9 PG
MCHC RBC-ENTMCNC: 33.7 GM/DL
MCV RBC AUTO: 88.5 FL
MONOCYTES # BLD AUTO: 0.48 K/UL
MONOCYTES NFR BLD AUTO: 7.3 %
NEUTROPHILS # BLD AUTO: 4.02 K/UL
NEUTROPHILS NFR BLD AUTO: 60.8 %
NONHDLC SERPL-MCNC: 94 MG/DL
PLATELET # BLD AUTO: 214 K/UL
POTASSIUM SERPL-SCNC: 4.5 MMOL/L
PROT SERPL-MCNC: 7.8 G/DL
RBC # BLD: 4.69 M/UL
RBC # FLD: 12.8 %
SODIUM SERPL-SCNC: 141 MMOL/L
TRIGL SERPL-MCNC: 53 MG/DL
TSH SERPL-ACNC: 1.53 UIU/ML
WBC # FLD AUTO: 6.6 K/UL

## 2024-09-24 PROBLEM — M25.332: Chronic | Status: ACTIVE | Noted: 2024-08-16

## 2024-09-26 ENCOUNTER — EMERGENCY (EMERGENCY)
Facility: HOSPITAL | Age: 29
LOS: 1 days | Discharge: HOME | End: 2024-09-26
Attending: EMERGENCY MEDICINE | Admitting: EMERGENCY MEDICINE
Payer: OTHER GOVERNMENT

## 2024-09-26 VITALS
HEART RATE: 66 BPM | SYSTOLIC BLOOD PRESSURE: 112 MMHG | RESPIRATION RATE: 16 BRPM | DIASTOLIC BLOOD PRESSURE: 72 MMHG | OXYGEN SATURATION: 99 %

## 2024-09-26 VITALS
WEIGHT: 164.91 LBS | HEART RATE: 66 BPM | OXYGEN SATURATION: 99 % | DIASTOLIC BLOOD PRESSURE: 78 MMHG | HEIGHT: 74 IN | TEMPERATURE: 98 F | SYSTOLIC BLOOD PRESSURE: 116 MMHG | RESPIRATION RATE: 16 BRPM

## 2024-09-26 LAB
ALBUMIN SERPL ELPH-MCNC: 4.1 G/DL — SIGNIFICANT CHANGE UP (ref 3.3–5)
ALP SERPL-CCNC: 40 U/L — SIGNIFICANT CHANGE UP (ref 30–120)
ALT FLD-CCNC: 18 U/L — SIGNIFICANT CHANGE UP (ref 10–60)
ANION GAP SERPL CALC-SCNC: 6 MMOL/L — SIGNIFICANT CHANGE UP (ref 5–17)
AST SERPL-CCNC: 15 U/L — SIGNIFICANT CHANGE UP (ref 10–40)
BASOPHILS # BLD AUTO: 0.04 K/UL — SIGNIFICANT CHANGE UP (ref 0–0.2)
BASOPHILS NFR BLD AUTO: 0.7 % — SIGNIFICANT CHANGE UP (ref 0–2)
BILIRUB SERPL-MCNC: 0.5 MG/DL — SIGNIFICANT CHANGE UP (ref 0.2–1.2)
BUN SERPL-MCNC: 15 MG/DL — SIGNIFICANT CHANGE UP (ref 7–23)
CALCIUM SERPL-MCNC: 9.6 MG/DL — SIGNIFICANT CHANGE UP (ref 8.4–10.5)
CHLORIDE SERPL-SCNC: 103 MMOL/L — SIGNIFICANT CHANGE UP (ref 96–108)
CO2 SERPL-SCNC: 29 MMOL/L — SIGNIFICANT CHANGE UP (ref 22–31)
CREAT SERPL-MCNC: 0.98 MG/DL — SIGNIFICANT CHANGE UP (ref 0.5–1.3)
D DIMER BLD IA.RAPID-MCNC: <150 NG/ML DDU — SIGNIFICANT CHANGE UP
EGFR: 106 ML/MIN/1.73M2 — SIGNIFICANT CHANGE UP
EOSINOPHIL # BLD AUTO: 0.05 K/UL — SIGNIFICANT CHANGE UP (ref 0–0.5)
EOSINOPHIL NFR BLD AUTO: 0.9 % — SIGNIFICANT CHANGE UP (ref 0–6)
GLUCOSE SERPL-MCNC: 82 MG/DL — SIGNIFICANT CHANGE UP (ref 70–99)
HCT VFR BLD CALC: 36.3 % — LOW (ref 39–50)
HGB BLD-MCNC: 12.4 G/DL — LOW (ref 13–17)
IMM GRANULOCYTES NFR BLD AUTO: 0.5 % — SIGNIFICANT CHANGE UP (ref 0–0.9)
LYMPHOCYTES # BLD AUTO: 1.9 K/UL — SIGNIFICANT CHANGE UP (ref 1–3.3)
LYMPHOCYTES # BLD AUTO: 33.2 % — SIGNIFICANT CHANGE UP (ref 13–44)
MAGNESIUM SERPL-MCNC: 2.2 MG/DL — SIGNIFICANT CHANGE UP (ref 1.6–2.6)
MCHC RBC-ENTMCNC: 29.2 PG — SIGNIFICANT CHANGE UP (ref 27–34)
MCHC RBC-ENTMCNC: 34.2 G/DL — SIGNIFICANT CHANGE UP (ref 32–36)
MCV RBC AUTO: 85.4 FL — SIGNIFICANT CHANGE UP (ref 80–100)
MONOCYTES # BLD AUTO: 0.45 K/UL — SIGNIFICANT CHANGE UP (ref 0–0.9)
MONOCYTES NFR BLD AUTO: 7.9 % — SIGNIFICANT CHANGE UP (ref 2–14)
NEUTROPHILS # BLD AUTO: 3.25 K/UL — SIGNIFICANT CHANGE UP (ref 1.8–7.4)
NEUTROPHILS NFR BLD AUTO: 56.8 % — SIGNIFICANT CHANGE UP (ref 43–77)
NRBC # BLD: 0 /100 WBCS — SIGNIFICANT CHANGE UP (ref 0–0)
PLATELET # BLD AUTO: 191 K/UL — SIGNIFICANT CHANGE UP (ref 150–400)
POTASSIUM SERPL-MCNC: 4.2 MMOL/L — SIGNIFICANT CHANGE UP (ref 3.5–5.3)
POTASSIUM SERPL-SCNC: 4.2 MMOL/L — SIGNIFICANT CHANGE UP (ref 3.5–5.3)
PROT SERPL-MCNC: 7.3 G/DL — SIGNIFICANT CHANGE UP (ref 6–8.3)
RBC # BLD: 4.25 M/UL — SIGNIFICANT CHANGE UP (ref 4.2–5.8)
RBC # FLD: 12.3 % — SIGNIFICANT CHANGE UP (ref 10.3–14.5)
SODIUM SERPL-SCNC: 138 MMOL/L — SIGNIFICANT CHANGE UP (ref 135–145)
TROPONIN I, HIGH SENSITIVITY RESULT: <4 NG/L — SIGNIFICANT CHANGE UP
TROPONIN I, HIGH SENSITIVITY RESULT: <4 NG/L — SIGNIFICANT CHANGE UP
WBC # BLD: 5.72 K/UL — SIGNIFICANT CHANGE UP (ref 3.8–10.5)
WBC # FLD AUTO: 5.72 K/UL — SIGNIFICANT CHANGE UP (ref 3.8–10.5)

## 2024-09-26 PROCEDURE — 85025 COMPLETE CBC W/AUTO DIFF WBC: CPT

## 2024-09-26 PROCEDURE — 84484 ASSAY OF TROPONIN QUANT: CPT

## 2024-09-26 PROCEDURE — 71046 X-RAY EXAM CHEST 2 VIEWS: CPT | Mod: 26

## 2024-09-26 PROCEDURE — 99284 EMERGENCY DEPT VISIT MOD MDM: CPT | Mod: 25

## 2024-09-26 PROCEDURE — 80053 COMPREHEN METABOLIC PANEL: CPT

## 2024-09-26 PROCEDURE — 85379 FIBRIN DEGRADATION QUANT: CPT

## 2024-09-26 PROCEDURE — 83735 ASSAY OF MAGNESIUM: CPT

## 2024-09-26 PROCEDURE — 99285 EMERGENCY DEPT VISIT HI MDM: CPT

## 2024-09-26 PROCEDURE — 71046 X-RAY EXAM CHEST 2 VIEWS: CPT

## 2024-09-26 PROCEDURE — 36415 COLL VENOUS BLD VENIPUNCTURE: CPT

## 2024-09-26 RX ORDER — SODIUM CHLORIDE 9 MG/ML
1000 INJECTION INTRAMUSCULAR; INTRAVENOUS; SUBCUTANEOUS ONCE
Refills: 0 | Status: COMPLETED | OUTPATIENT
Start: 2024-09-26 | End: 2024-09-26

## 2024-09-26 RX ADMIN — SODIUM CHLORIDE 1000 MILLILITER(S): 9 INJECTION INTRAMUSCULAR; INTRAVENOUS; SUBCUTANEOUS at 16:00

## 2024-09-26 NOTE — ED PROVIDER NOTE - NSICDXFAMILYHX_GEN_ALL_CORE_FT
FAMILY HISTORY:  Grandparent  Still living? Unknown  FHx: heart disease, Age at diagnosis: Age Unknown

## 2024-09-26 NOTE — ED ADULT NURSE NOTE - NS ED NURSE LEVEL OF CONSCIOUSNESS ORIENTATION
[facility], Ambulance Department Oriented - self; Oriented - place; Oriented - time

## 2024-09-26 NOTE — ED PROVIDER NOTE - OBJECTIVE STATEMENT
Patient complaining of approximately 4 days of constant palpitations which he describes as fluttering in his chest associated with mild dyspnea.  Patient denies fever chest pain cough abdominal pain nausea vomiting edema calf pain travel.  No history of PE or DVT.  Patient made an appointment with her cardiologist however could not see until October 7, so came to the ER to be checked.  Patient reports he does drink coffee  PMD Schoenberger cardiologist [place]

## 2024-09-26 NOTE — ED PROVIDER NOTE - CLINICAL SUMMARY MEDICAL DECISION MAKING FREE TEXT BOX
Patient complaining of approximately 4 days of constant palpitations which he describes as fluttering in his chest associated with mild dyspnea.  Patient denies fever chest pain cough abdominal pain nausea vomiting edema calf pain travel.  No history of PE or DVT.  Patient made an appointment with her cardiologist however could not see until October 7, so came to the ER to be checked.  Patient reports he does drink coffee  [facility]    Plan EKG chest x-ray labs IV fluids    Differential including but not limited to MI ACS A-fib SVT PVCs or other arrhythmia PE electrolyte abnormality dehydration

## 2024-09-26 NOTE — ED PROVIDER NOTE - DIFFERENTIAL DIAGNOSIS
Differential Diagnosis Differential including but not limited to MI ACS A-fib SVT PVCs or other arrhythmia PE electrolyte abnormality dehydration

## 2024-09-26 NOTE — ED PROVIDER NOTE - NSFOLLOWUPINSTRUCTIONS_ED_ALL_ED_FT
Palpitations  Palpitations are feelings that your heartbeat is irregular or is faster than normal. It may feel like your heart is fluttering or skipping a beat. Palpitations may be caused by many things, including smoking, caffeine, alcohol, stress, and certain medicines or drugs. Most causes of palpitations are not serious.    However, some palpitations can be a sign of a serious problem. Further tests and a thorough medical history will be done to find the cause of your palpitations. Your provider may order tests such as an ECG, labs, an echocardiogram, or an ambulatory continuous ECG monitor.    Follow these instructions at home:  Pay attention to any changes in your symptoms. Let your health care provider know about them. Take these actions to help manage your symptoms:    Eating and drinking    Follow instructions from your health care provider about eating or drinking restrictions. You may need to avoid foods and drinks that may cause palpitations. These may include:  Caffeinated coffee, tea, soft drinks, and energy drinks.  Chocolate.  Alcohol.  Diet pills.  Lifestyle    A person sitting on the floor doing yoga.  A sign telling the reader not to smoke.  Take steps to reduce your stress and anxiety. Things that can help you relax include:  Yoga.  Mind-body activities, such as deep breathing, meditation, or using words and images to create positive thoughts (guided imagery).  Physical activity, such as swimming, jogging, or walking. Tell your health care provider if your palpitations increase with activity. If you have chest pain or shortness of breath with activity, do not continue the activity until you are seen by your health care provider.  Biofeedback. This is a method that helps you learn to use your mind to control things in your body, such as your heartbeat.  Get plenty of rest and sleep. Keep a regular bed time.  Do not use drugs, including cocaine or ecstasy. Do not use marijuana.  Do not use any products that contain nicotine or tobacco. These products include cigarettes, chewing tobacco, and vaping devices, such as e-cigarettes. If you need help quitting, ask your health care provider.  General instructions    Take over-the-counter and prescription medicines only as told by your health care provider.  Keep all follow-up visits. This is important. These may include visits for further testing if palpitations do not go away or get worse.  Contact a health care provider if:  You continue to have a fast or irregular heartbeat for a long period of time.  You notice that your palpitations occur more often.  Get help right away if:  You have chest pain or shortness of breath.  You have a severe headache.  You feel dizzy or you faint.  These symptoms may represent a serious problem that is an emergency. Do not wait to see if the symptoms will go away. Get medical help right away. Call your local emergency services (911 in the [place] Do not drive yourself to the hospital.    Summary  Palpitations are feelings that your heartbeat is irregular or is faster than normal. It may feel like your heart is fluttering or skipping a beat.  Palpitations may be caused by many things, including smoking, caffeine, alcohol, stress, certain medicines, and drugs.  Further tests and a thorough medical history may be done to find the cause of your palpitations.  Get help right away if you faint or have chest pain, shortness of breath, severe headache, or dizziness.  This information is not intended to replace advice given to you by your health care provider. Make sure you discuss any questions you have with your health care provider.        SHORTNESS OF BREATH - [facility](R) Instructions(ER/ED)    Shortness of Breath    WHAT YOU NEED TO KNOW:    Shortness of breath is a feeling that you cannot get enough air when you breathe in. You may have this feeling only during activity, or all the time. Your symptoms can range from mild to severe. Shortness of breath may be a sign of a serious health condition that needs immediate care.    DISCHARGE INSTRUCTIONS:    Return to the emergency department if:    Your signs and symptoms are the same or worse within 24 hours of treatment.    The skin over your ribs or on your neck sinks in when you breathe.    You feel confused or dizzy.  Contact your healthcare provider if:    You have new or worsening symptoms.    You have questions or concerns about your condition or care.  Medicines:    Medicines may be used to treat the cause of your symptoms. You may need medicine to treat a bacterial infection or reduce anxiety. Other medicines may be used to open your airway, reduce swelling, or remove extra fluid. If you have a heart condition, you may need medicine to help your heart beat more strongly or regularly.    Take your medicine as directed. Contact your healthcare provider if you think your medicine is not helping or if you have side effects. Tell your provider if you are allergic to any medicine. Keep a list of the medicines, vitamins, and herbs you take. Include the amounts, and when and why you take them. Bring the list or the pill bottles to follow-up visits. Carry your medicine list with you in case of an emergency.  Manage shortness of breath:    Create an action plan. You and your healthcare provider can work together to create a plan for how to handle shortness of breath. The plan can include daily activities, treatment changes, and what to do if you have severe breathing problems.    Lean forward on your elbows when you sit. This helps your lungs expand and may make it easier to breathe.    Use pursed-lip breathing any time you feel short of breath. Breathe in through your nose and then slowly breathe out through your mouth with your lips slightly puckered. It should take you twice as long to breathe out as it did to breathe in.  Breathe in Breathe out      Do not smoke. Nicotine and other chemicals in cigarettes and cigars can cause lung damage and make shortness of breath worse. Ask your healthcare provider for information if you currently smoke and need help to quit. E-cigarettes or smokeless tobacco still contain nicotine. Talk to your healthcare provider before you use these products.    Reach or maintain a healthy weight. Your healthcare provider can help you create a safe weight loss plan if you are overweight.    Exercise as directed. Exercise can help your lungs work more easily. Exercise can also help you lose weight if needed. Try to get at least 30 minutes of exercise most days of the week.  Follow up with your healthcare provider or specialist as directed: Write down your questions so you remember to ask them during your visits.

## 2024-09-26 NOTE — ED ADULT TRIAGE NOTE - BSA (M2)
Called and spoke with pts wife, Stephanie. She stated that starting yesterday the pt developed a fever of 101.6. Pt took tylenol and the fever came down to 99. She also stated that the pt is experiencing NO pain, has had chills and has bene \"sweaty\". Pts wife also noted that when the pt woke up his testicles were \"very red\". She is not with him right now and therefore was unable to tell me if they were still very red. Pt able to urinate normally. Let Stephanie know that I would make Dr. Landeros aware and will call them with a plan. Stephanie thankful for call.     Called pts wife back to check in before end of day. Stephanie stated that Dr. Landeros did reach out to the pt and told them to continue to monitor and reach out with any questions or concerns. Stephanie thankful for call.    2

## 2024-09-26 NOTE — ED PROVIDER NOTE - PATIENT PORTAL LINK FT
You can access the FollowMyHealth Patient Portal offered by [facility] by registering at the following website: [email or web address]. By joining [employer]’s FollowMyHealth portal, you will also be able to view your health information using other applications (apps) compatible with our system.

## 2024-09-26 NOTE — ED PROVIDER NOTE - CARE PROVIDER_API CALL
Schoenberger, Lisa  Family Medicine  [address], [address]  [place] [address]  Phone: [phone]  Fax: [phone]  Follow Up Time: 1-3 Days    Sb Morales  Cardiology  [address] [place] [address]  Phone: [phone]  Fax: [phone]  Follow Up Time: 1-3 Days

## 2024-09-26 NOTE — ED PROVIDER NOTE - CARE PROVIDERS DIRECT ADDRESSES
[email or web address]

## 2024-09-26 NOTE — ED ADULT NURSE NOTE - OBJECTIVE STATEMENT
dizzy, shortness of breath and chest fluttering  for 4 days    ekg done, never had this before, denies /GI symptoms, cardiac monitor placed on pt and tele tech aware. Pt is in no acute distress. IV accessed and tolerating. Labs drawn & sent results pending. comfort measure provided, callbell within reach, reinforced surrounding and plan of care, denies n/v/d or sick contact, plan of care ongoing

## 2024-09-26 NOTE — ED PROVIDER NOTE - NSICDXPASTSURGICALHX_GEN_ALL_CORE_FT
PAST SURGICAL HISTORY:  Fracture of ankle, right, closed repaired  by ORIF 9/2012    H/O fracture of ankle 9/2012

## 2024-09-27 ENCOUNTER — APPOINTMENT (OUTPATIENT)
Dept: CARDIOLOGY | Facility: CLINIC | Age: 29
End: 2024-09-27
Payer: OTHER GOVERNMENT

## 2024-09-27 ENCOUNTER — NON-APPOINTMENT (OUTPATIENT)
Age: 29
End: 2024-09-27

## 2024-09-27 VITALS
HEIGHT: 74 IN | WEIGHT: 174 LBS | DIASTOLIC BLOOD PRESSURE: 68 MMHG | OXYGEN SATURATION: 100 % | BODY MASS INDEX: 22.33 KG/M2 | HEART RATE: 52 BPM | SYSTOLIC BLOOD PRESSURE: 103 MMHG

## 2024-09-27 DIAGNOSIS — R00.2 PALPITATIONS: ICD-10-CM

## 2024-09-27 PROCEDURE — G2211 COMPLEX E/M VISIT ADD ON: CPT

## 2024-09-27 PROCEDURE — 99205 OFFICE O/P NEW HI 60 MIN: CPT

## 2024-09-27 PROCEDURE — 93000 ELECTROCARDIOGRAM COMPLETE: CPT

## 2024-09-27 NOTE — HISTORY OF PRESENT ILLNESS
[FreeTextEntry1] : This is a 29 year old man with no medical history who presents to the office for a cardiac evaluation.  He has been under a lot of stress lately, and has been feeling his heart racing.  He feels forceful contractions, and at times is dizzy.  He is expecting his first baby, and is graduating from the [employer].  He is seeing a therapist, and has felt generally more anxious lately.  NO chest pain, increased dyspnea, PND, orthopnea, LE swelling.   He has PACs on his EKG today.

## 2024-09-27 NOTE — DISCUSSION/SUMMARY
[FreeTextEntry1] : This is a 29 year old man with no medical history who presents to the office for a cardiac evaluation.  He has been under a lot of stress lately, and has been feeling his heart racing.  He feels forceful contractions, and at times is dizzy.   He has PACs on his EKG today.  he went to the ED at [facility], and had a negative evaluation.  I reviewed the ED visit in detail.  I think his symptoms are related to stress and anxiety.  I am checking an echocardiogram and placing a 2 week Zio.  We discussed the benefits of a healthy diet, regular exercise and physical activity, and weight loss in detail.  I will follow up with him regarding the results.  [EKG obtained to assist in diagnosis and management of assessed problem(s)] : EKG obtained to assist in diagnosis and management of assessed problem(s)

## 2024-10-07 ENCOUNTER — APPOINTMENT (OUTPATIENT)
Dept: CARDIOLOGY | Facility: CLINIC | Age: 29
End: 2024-10-07
Payer: OTHER GOVERNMENT

## 2024-10-07 PROCEDURE — 93306 TTE W/DOPPLER COMPLETE: CPT

## 2024-12-23 ENCOUNTER — APPOINTMENT (OUTPATIENT)
Dept: FAMILY MEDICINE | Facility: CLINIC | Age: 29
End: 2024-12-23
Payer: OTHER GOVERNMENT

## 2024-12-23 VITALS
WEIGHT: 165 LBS | RESPIRATION RATE: 16 BRPM | SYSTOLIC BLOOD PRESSURE: 108 MMHG | HEART RATE: 55 BPM | TEMPERATURE: 98.3 F | DIASTOLIC BLOOD PRESSURE: 72 MMHG | HEIGHT: 74 IN | BODY MASS INDEX: 21.17 KG/M2 | OXYGEN SATURATION: 98 %

## 2024-12-23 DIAGNOSIS — K64.4 RESIDUAL HEMORRHOIDAL SKIN TAGS: ICD-10-CM

## 2024-12-23 PROCEDURE — 99213 OFFICE O/P EST LOW 20 MIN: CPT

## 2024-12-23 RX ORDER — HYDROCORTISONE 25 MG/G
2.5 CREAM TOPICAL 3 TIMES DAILY
Qty: 1 | Refills: 0 | Status: ACTIVE | COMMUNITY
Start: 2024-12-23 | End: 1900-01-01

## 2025-01-13 ENCOUNTER — APPOINTMENT (OUTPATIENT)
Dept: COLORECTAL SURGERY | Facility: CLINIC | Age: 30
End: 2025-01-13

## 2025-08-01 ENCOUNTER — APPOINTMENT (OUTPATIENT)
Age: 30
End: 2025-08-01
Payer: COMMERCIAL

## 2025-08-01 DIAGNOSIS — L60.3 NAIL DYSTROPHY: ICD-10-CM

## 2025-08-01 DIAGNOSIS — M25.572 PAIN IN LEFT ANKLE AND JOINTS OF LEFT FOOT: ICD-10-CM

## 2025-08-01 DIAGNOSIS — M20.42 OTHER HAMMER TOE(S) (ACQUIRED), LEFT FOOT: ICD-10-CM

## 2025-08-01 DIAGNOSIS — L60.8 OTHER NAIL DISORDERS: ICD-10-CM

## 2025-08-01 DIAGNOSIS — L60.1 ONYCHOLYSIS: ICD-10-CM

## 2025-08-01 PROCEDURE — 73630 X-RAY EXAM OF FOOT: CPT | Mod: LT

## 2025-08-01 PROCEDURE — 99203 OFFICE O/P NEW LOW 30 MIN: CPT | Mod: 25

## 2025-08-01 PROCEDURE — 10140 I&D HMTMA SEROMA/FLUID COLLJ: CPT | Mod: T1

## 2025-08-18 ENCOUNTER — APPOINTMENT (OUTPATIENT)
Dept: COLORECTAL SURGERY | Facility: CLINIC | Age: 30
End: 2025-08-18

## 2025-08-18 VITALS
WEIGHT: 170 LBS | SYSTOLIC BLOOD PRESSURE: 122 MMHG | HEIGHT: 74 IN | RESPIRATION RATE: 15 BRPM | OXYGEN SATURATION: 100 % | DIASTOLIC BLOOD PRESSURE: 67 MMHG | BODY MASS INDEX: 21.82 KG/M2 | HEART RATE: 57 BPM

## 2025-08-18 DIAGNOSIS — L29.0 PRURITUS ANI: ICD-10-CM

## 2025-08-18 DIAGNOSIS — K64.9 UNSPECIFIED HEMORRHOIDS: ICD-10-CM

## 2025-08-18 DIAGNOSIS — Z78.9 OTHER SPECIFIED HEALTH STATUS: ICD-10-CM

## 2025-08-18 PROCEDURE — 99204 OFFICE O/P NEW MOD 45 MIN: CPT | Mod: 25

## 2025-08-18 PROCEDURE — 46600 DIAGNOSTIC ANOSCOPY SPX: CPT

## 2025-08-18 RX ORDER — HYDROCORTISONE 25 MG/G
2.5 CREAM TOPICAL
Qty: 1 | Refills: 3 | Status: ACTIVE | COMMUNITY
Start: 2025-08-18 | End: 1900-01-01

## 2025-08-19 ENCOUNTER — APPOINTMENT (OUTPATIENT)
Facility: CLINIC | Age: 30
End: 2025-08-19

## 2025-09-05 ENCOUNTER — APPOINTMENT (OUTPATIENT)
Dept: ORTHOPEDIC SURGERY | Facility: CLINIC | Age: 30
End: 2025-09-05